# Patient Record
Sex: MALE | Race: WHITE | NOT HISPANIC OR LATINO | Employment: UNEMPLOYED | URBAN - METROPOLITAN AREA
[De-identification: names, ages, dates, MRNs, and addresses within clinical notes are randomized per-mention and may not be internally consistent; named-entity substitution may affect disease eponyms.]

---

## 2018-06-03 ENCOUNTER — HOSPITAL ENCOUNTER (EMERGENCY)
Facility: HOSPITAL | Age: 38
Discharge: HOME | End: 2018-06-04
Attending: EMERGENCY MEDICINE
Payer: MEDICAID

## 2018-06-03 DIAGNOSIS — Z72.0 TOBACCO ABUSE: ICD-10-CM

## 2018-06-03 DIAGNOSIS — T50.901A ACCIDENTAL DRUG OVERDOSE, INITIAL ENCOUNTER: Primary | ICD-10-CM

## 2018-06-03 LAB
ALBUMIN SERPL BCP-MCNC: 3.7 G/DL
ALP SERPL-CCNC: 63 U/L
ALT SERPL W/O P-5'-P-CCNC: 45 U/L
AMPHET+METHAMPHET UR QL: NORMAL
ANION GAP SERPL CALC-SCNC: 11 MMOL/L
APAP SERPL-MCNC: <3 UG/ML
AST SERPL-CCNC: 35 U/L
BARBITURATES UR QL SCN>200 NG/ML: NEGATIVE
BASOPHILS # BLD AUTO: 0.02 K/UL
BASOPHILS NFR BLD: 0.2 %
BENZODIAZ UR QL SCN>200 NG/ML: NORMAL
BILIRUB SERPL-MCNC: 0.9 MG/DL
BILIRUB UR QL STRIP: NEGATIVE
BUN SERPL-MCNC: 14 MG/DL
BZE UR QL SCN: NEGATIVE
CALCIUM SERPL-MCNC: 9.2 MG/DL
CANNABINOIDS UR QL SCN: NEGATIVE
CHLORIDE SERPL-SCNC: 108 MMOL/L
CLARITY UR: CLEAR
CO2 SERPL-SCNC: 23 MMOL/L
COLOR UR: YELLOW
CREAT SERPL-MCNC: 1.1 MG/DL
CREAT UR-MCNC: 255.9 MG/DL
DIFFERENTIAL METHOD: NORMAL
EOSINOPHIL # BLD AUTO: 0.2 K/UL
EOSINOPHIL NFR BLD: 2 %
ERYTHROCYTE [DISTWIDTH] IN BLOOD BY AUTOMATED COUNT: 12.9 %
EST. GFR  (AFRICAN AMERICAN): >60 ML/MIN/1.73 M^2
EST. GFR  (NON AFRICAN AMERICAN): >60 ML/MIN/1.73 M^2
ETHANOL SERPL-MCNC: <10 MG/DL
GLUCOSE SERPL-MCNC: 118 MG/DL
GLUCOSE UR QL STRIP: NEGATIVE
HCT VFR BLD AUTO: 41.1 %
HGB BLD-MCNC: 14.4 G/DL
HGB UR QL STRIP: NEGATIVE
KETONES UR QL STRIP: ABNORMAL
LEUKOCYTE ESTERASE UR QL STRIP: NEGATIVE
LYMPHOCYTES # BLD AUTO: 3.2 K/UL
LYMPHOCYTES NFR BLD: 29.8 %
MCH RBC QN AUTO: 30.3 PG
MCHC RBC AUTO-ENTMCNC: 35 G/DL
MCV RBC AUTO: 86 FL
METHADONE UR QL SCN>300 NG/ML: NEGATIVE
MONOCYTES # BLD AUTO: 0.8 K/UL
MONOCYTES NFR BLD: 7.3 %
NEUTROPHILS # BLD AUTO: 6.5 K/UL
NEUTROPHILS NFR BLD: 60.7 %
NITRITE UR QL STRIP: NEGATIVE
OPIATES UR QL SCN: NORMAL
PCP UR QL SCN>25 NG/ML: NEGATIVE
PH UR STRIP: 7 [PH] (ref 5–8)
PLATELET # BLD AUTO: 303 K/UL
PMV BLD AUTO: 9.8 FL
POTASSIUM SERPL-SCNC: 4.4 MMOL/L
PROT SERPL-MCNC: 8.3 G/DL
PROT UR QL STRIP: NEGATIVE
RBC # BLD AUTO: 4.76 M/UL
SODIUM SERPL-SCNC: 142 MMOL/L
SP GR UR STRIP: 1.02 (ref 1–1.03)
TOXICOLOGY INFORMATION: NORMAL
URN SPEC COLLECT METH UR: ABNORMAL
UROBILINOGEN UR STRIP-ACNC: 1 EU/DL
WBC # BLD AUTO: 10.75 K/UL

## 2018-06-03 PROCEDURE — 80320 DRUG SCREEN QUANTALCOHOLS: CPT

## 2018-06-03 PROCEDURE — 99284 EMERGENCY DEPT VISIT MOD MDM: CPT | Mod: 25

## 2018-06-03 PROCEDURE — 80329 ANALGESICS NON-OPIOID 1 OR 2: CPT

## 2018-06-03 PROCEDURE — 80053 COMPREHEN METABOLIC PANEL: CPT

## 2018-06-03 PROCEDURE — 85025 COMPLETE CBC W/AUTO DIFF WBC: CPT

## 2018-06-03 PROCEDURE — 96374 THER/PROPH/DIAG INJ IV PUSH: CPT

## 2018-06-03 PROCEDURE — 80307 DRUG TEST PRSMV CHEM ANLYZR: CPT

## 2018-06-03 PROCEDURE — 63600175 PHARM REV CODE 636 W HCPCS: Performed by: EMERGENCY MEDICINE

## 2018-06-03 PROCEDURE — 81003 URINALYSIS AUTO W/O SCOPE: CPT | Mod: 59

## 2018-06-03 RX ORDER — NALOXONE HYDROCHLORIDE 1 MG/ML
2 INJECTION INTRAMUSCULAR; INTRAVENOUS; SUBCUTANEOUS ONCE
Status: COMPLETED | OUTPATIENT
Start: 2018-06-03 | End: 2018-06-03

## 2018-06-03 RX ADMIN — NALOXONE HYDROCHLORIDE 2 MG: 1 INJECTION PARENTERAL at 06:06

## 2018-06-03 NOTE — ED PROVIDER NOTES
SCRIBE #1 NOTE: I, Griselda Barrera, am scribing for, and in the presence of, Ilda Alvarado Do, MD. I have scribed the HPI, ROS, and PEx.     SCRIBE #2 NOTE: I, Danieladomingo Olson, am scribing for, and in the presence of,  Rhiannon Epperson DO. I have scribed the remaining portions of the note not scribed by Scribe #1.     History      Chief Complaint   Patient presents with    Drug Overdose     heroin       Review of patient's allergies indicates:  No Known Allergies     HPI   HPI    6/3/2018, 6:43 PM   History obtained from EMS  HPI limited secondary to pt's condition      History of Present Illness: Kevyn De La Cruz is a 38 y.o. male patient who presents to the Emergency Department for a possible heroin overdose which onset suddenly PTA. Prior Tx includes 0.5mg Narcan en route with positive response.They state [place]. O2 sat's good en route. Pt was combative en route.       Arrival mode: EMS    PCP: Unknown       Past Medical History:  Past Medical History:   Diagnosis Date    Migraine headache        Past Surgical History:  No past surgical history on file.      Family History:  No family history on file.    Social History:  Social History     Social History Main Topics    Smoking status: Current Every Day Smoker     Packs/day: 0.50     Types: Cigarettes    Smokeless tobacco: Not on file    Alcohol use No    Drug use: No    Sexual activity: Not on file       ROS   Review of Systems   Unable to perform ROS: Acuity of condition     Physical Exam      Initial Vitals   BP Pulse Resp Temp SpO2   06/03/18 1905 06/03/18 1840 06/03/18 1854 06/03/18 1840 06/03/18 1840   128/76 97 (!) 23 97.8 °F (36.6 °C) 97 %      MAP       06/03/18 1905       93.33          Physical Exam  Nursing Notes and Vital Signs Reviewed.  Constitutional: Patient is in moderate distress. Well-developed and well-nourished.  Head: Atraumatic. Normocephalic.  Eyes: PERRL. EOM intact. Conjunctivae are not  "pale. No scleral icterus.  ENT: Mucous membranes are moist. Oropharynx is clear and symmetric.    Neck: Full ROM.   Cardiovascular: Regular rate. Regular rhythm. No murmurs, rubs, or gallops. Distal pulses are 2+ and symmetric.  Pulmonary/Chest: No respiratory distress. Clear to auscultation bilaterally. No wheezing or rales.  Abdominal: Soft and non-distended.   Musculoskeletal: Moves all extremities. No obvious deformities. No edema.   Skin: Warm and dry.  Neurological:  Pt is not alert    ED Course    Procedures  ED Vital Signs:  Vitals:    06/03/18 1840 06/03/18 1854 06/03/18 1905 06/03/18 1949   BP:   128/76 (!) 174/94   Pulse: 97 101 98 83   Resp:  (!) 23 (!) 21 20   Temp: 97.8 °F (36.6 °C)      TempSrc: Oral      SpO2: 97% 97% 95% 99%   Height: 5' 11" (1.803 m)       06/03/18 2017 06/03/18 2018 06/03/18 2032 06/03/18 2102   BP:  (!) 151/69 (!) 148/66 (!) 146/67   Pulse:  72 62 101   Resp:  (!) 21 20 19   Temp: 97.8 °F (36.6 °C)      TempSrc: Axillary      SpO2:  97% 99% 99%   Height:        06/03/18 2132 06/03/18 2202 06/03/18 2301 06/04/18 0001   BP: 130/80 117/73 119/66 125/83   Pulse: 66 68 65 69   Resp: 20 18 18 19   Temp:       TempSrc:       SpO2: 97% 98% 99% 98%   Height:        06/04/18 0100   BP: (!) 98/50   Pulse: 67   Resp: 18   Temp:    TempSrc:    SpO2: 97%   Height:        Abnormal Lab Results:  Labs Reviewed   COMPREHENSIVE METABOLIC PANEL - Abnormal; Notable for the following:        Result Value    Glucose 118 (*)     ALT 45 (*)     All other components within normal limits   URINALYSIS - Abnormal; Notable for the following:     Ketones, UA Trace (*)     All other components within normal limits   ACETAMINOPHEN LEVEL - Abnormal; Notable for the following:     Acetaminophen (Tylenol), Serum <3.0 (*)     All other components within normal limits   CBC W/ AUTO DIFFERENTIAL   DRUG SCREEN PANEL, URINE EMERGENCY   ALCOHOL,MEDICAL (ETHANOL)        All Lab Results:  Results for orders placed or " performed during the hospital encounter of 06/03/18   CBC auto differential   Result Value Ref Range    WBC 10.75 3.90 - 12.70 K/uL    RBC 4.76 4.60 - 6.20 M/uL    Hemoglobin 14.4 14.0 - 18.0 g/dL    Hematocrit 41.1 40.0 - 54.0 %    MCV 86 82 - 98 fL    MCH 30.3 27.0 - 31.0 pg    MCHC 35.0 32.0 - 36.0 g/dL    RDW 12.9 11.5 - 14.5 %    Platelets 303 150 - 350 K/uL    MPV 9.8 9.2 - 12.9 fL    Gran # (ANC) 6.5 1.8 - 7.7 K/uL    Lymph # 3.2 1.0 - 4.8 K/uL    Mono # 0.8 0.3 - 1.0 K/uL    Eos # 0.2 0.0 - 0.5 K/uL    Baso # 0.02 0.00 - 0.20 K/uL    Gran% 60.7 38.0 - 73.0 %    Lymph% 29.8 18.0 - 48.0 %    Mono% 7.3 4.0 - 15.0 %    Eosinophil% 2.0 0.0 - 8.0 %    Basophil% 0.2 0.0 - 1.9 %    Differential Method Automated    Comprehensive metabolic panel   Result Value Ref Range    Sodium 142 136 - 145 mmol/L    Potassium 4.4 3.5 - 5.1 mmol/L    Chloride 108 95 - 110 mmol/L    CO2 23 23 - 29 mmol/L    Glucose 118 (H) 70 - 110 mg/dL    BUN, Bld 14 6 - 20 mg/dL    Creatinine 1.1 0.5 - 1.4 mg/dL    Calcium 9.2 8.7 - 10.5 mg/dL    Total Protein 8.3 6.0 - 8.4 g/dL    Albumin 3.7 3.5 - 5.2 g/dL    Total Bilirubin 0.9 0.1 - 1.0 mg/dL    Alkaline Phosphatase 63 55 - 135 U/L    AST 35 10 - 40 U/L    ALT 45 (H) 10 - 44 U/L    Anion Gap 11 8 - 16 mmol/L    eGFR if African American >60 >60 mL/min/1.73 m^2    eGFR if non African American >60 >60 mL/min/1.73 m^2   Urinalysis - clean catch   Result Value Ref Range    Specimen UA Urine, Catheterized     Color, UA Yellow Yellow, Straw, [place]    Appearance, UA Clear Clear    pH, UA 7.0 5.0 - 8.0    Specific Gravity, UA 1.020 1.005 - 1.030    Protein, UA Negative Negative    Glucose, UA Negative Negative    Ketones, UA Trace (A) Negative    Bilirubin (UA) Negative Negative    Occult Blood UA Negative Negative    Nitrite, UA Negative Negative    Urobilinogen, UA 1.0 <2.0 EU/dL    Leukocytes, UA Negative Negative   Drug screen panel, emergency   Result Value Ref Range    Benzodiazepines  Presumptive Positive     Methadone metabolites Negative     Cocaine (Metab.) Negative     Opiate Scrn, Ur Presumptive Positive     Barbiturate Screen, Ur Negative     Amphetamine Screen, Ur Presumptive Positive     THC Negative     Phencyclidine Negative     Creatinine, Random Ur 255.9 23.0 - 375.0 mg/dL    Toxicology Information SEE COMMENT    Ethanol   Result Value Ref Range    Alcohol, Medical, Serum <10 <10 mg/dL   Acetaminophen level   Result Value Ref Range    Acetaminophen (Tylenol), Serum <3.0 (L) 10.0 - 20.0 ug/mL           ED Discussion     12:00 AM: Dr. Mcneill transfers care of pt to Dr. Epperson, pending lab results.    2:27 AM patient is awake alert and oriented.  Patient denies any suicidal ideation.  Patient states that he had taken a pill that was dipped and fentanyl.  He states that he was going through withdrawal symptoms from heroin.  He had taken half a tablet a reports that he did feel anything.  He took another half a tablet.  He states about an hour later he still did not feel anything, so he took another full tablet.  Patient denies any suicidal ideation.  He states that he is recovering from heroin use.  Patient denies any severe depression.  He states me kill a body that looks like this.  Patient denies any headache, cough cold congestion, temperature, fever, abdominal pain, chest pain, chest pressure, problems urinating.  General:  Patient is awake alert oriented and pleasant  Heart regular rate and rhythm  Lungs clear to auscultation  Neuro cranial nerves 2-12 intact.  Sanjiv coma Scale 15.  Patient is able to ambulate without assistance  Psych:  Patient is appropriate and pleasant.  Denies SI.  Denies HI.    There is no criteria for 72 hr hold on this patient.  He was extensively counseled on drug abuse.  Patient will provided resources for drug counseling.  Patient verbalized understanding.  All questions answered.    Regarding TOBACCO USE DISORDER, patient was encouraged to:  make  changes to lifestyle and habits to help reduce craving for cigarettes; satisfy oral habits in other ways (eat celery or another low-calorie snack, chew sugarless gum, etc.); only frequent public places and restaurants where smoking is prohibited or restricted; eat regular meals and avoid sweet snacks or candy; and to exercise more frequently.  Patient was educated on setting goals for quitting and complications associated with tobacco use. Resources were provided to patient for smoking cessation opportunities offered in the community.     3:11 AM: Reassessed pt at this time.  Pt states his condition has improved at this time. Discussed with pt all pertinent ED information and results. Discussed pt dx and plan of tx. Gave pt all f/u and return to the ED instructions. All questions and concerns were addressed at this time. Pt expresses understanding of information and instructions, and is comfortable with plan to discharge. Pt is stable for discharge.    I discussed with patient that evaluation in the ED does not suggest any emergent or life threatening medical conditions requiring immediate intervention beyond what was provided in the ED, and I believe patient is safe for discharge.  Regardless, an unremarkable evaluation in the ED does not preclude the development or presence of a serious of life threatening condition. As such, patient was instructed to return immediately for any worsening or change in current symptoms.       The Emergency Provider reviewed the vital signs and test results, which are outlined above.      ED Medication(s):  Medications   naloxone injection 2 mg (2 mg Intravenous Not Given 6/3/18 1945)       New Prescriptions    No medications on file       Follow-up Information     [facility].    Why:  REturn to ED for:   Suicidal thoughts, homicidal thoughts, severe depression, or other concerns.  Contact information:  [address] [place] [id number] [phone]                      Medical Decision Making    Medical Decision Making:   Clinical Tests:   Lab Tests: Ordered and Reviewed           Scribe Attestation:   Scribe #1: I performed the above scribed service and the documentation accurately describes the services I performed. I attest to the accuracy of the note.    Attending:   Physician Attestation Statement for Scribe #1: I, Ilda Alvarado Do, MD, personally performed the services described in this documentation, as scribed by Griselda Barrera, in my presence, and it is both accurate and complete.       Scribe Attestation:   Scribe #2: I performed the above scribed service and the documentation accurately describes the services I performed. I attest to the accuracy of the note.    Attending Attestation:           Physician Attestation for Scribe:    Physician Attestation Statement for Scribe #2: I, Rhiannon Epperson DO, reviewed documentation, as scribed by Sondra Olson in my presence, and it is both accurate and complete. I also acknowledge and confirm the content of the note done by Scribe #1.          Clinical Impression       ICD-10-CM ICD-9-CM   1. Accidental drug overdose, initial encounter T50.901A 977.9     E858.9   2. Tobacco abuse Z72.0 305.1       Disposition:   Disposition: Discharged  Condition: Stable         Ilda Alvarado Do, MD  06/04/18 1008

## 2018-06-04 VITALS
TEMPERATURE: 98 F | DIASTOLIC BLOOD PRESSURE: 67 MMHG | HEIGHT: 71 IN | OXYGEN SATURATION: 98 % | HEART RATE: 69 BPM | RESPIRATION RATE: 18 BRPM | WEIGHT: 187.19 LBS | BODY MASS INDEX: 26.21 KG/M2 | SYSTOLIC BLOOD PRESSURE: 105 MMHG

## 2018-06-04 NOTE — DISCHARGE INSTRUCTIONS
Do not take any sedative medications (benadryl, ativan, xanax, trazadone); pain pills (lortab, percocet), alcohol, or drugs (heroine, roxicodone) as you were seen for an overdose.    Return to the ED for:   Suicidal thoughts, unable to awaken, slurred speech, difficulty staying awake or other concerns.

## 2018-06-04 NOTE — ED NOTES
Patient woke up screaming, reoriented to person/place/time/situation. Remains disoriented x4 and intermittently combative.

## 2018-06-04 NOTE — ED NOTES
Pt sitting upright in bed, aaox4, in no acute distress, and with no current complaint. Pt aware he is ready for discharge and states he is waiting on his girlfriend to get here for a ride home. Will continue to monitor. Bed in low position, side rails up, call light in reach.